# Patient Record
Sex: MALE | Race: WHITE | NOT HISPANIC OR LATINO | Employment: OTHER | URBAN - NONMETROPOLITAN AREA
[De-identification: names, ages, dates, MRNs, and addresses within clinical notes are randomized per-mention and may not be internally consistent; named-entity substitution may affect disease eponyms.]

---

## 2017-05-30 DIAGNOSIS — M25.521 RIGHT ELBOW PAIN: Primary | ICD-10-CM

## 2017-06-05 ENCOUNTER — HOSPITAL ENCOUNTER (OUTPATIENT)
Dept: GENERAL RADIOLOGY | Facility: HOSPITAL | Age: 57
Discharge: HOME | End: 2017-06-05
Attending: ORTHOPAEDIC SURGERY | Admitting: ORTHOPAEDIC SURGERY

## 2017-06-05 ENCOUNTER — OFFICE VISIT (OUTPATIENT)
Dept: ORTHOPEDIC SURGERY | Facility: CLINIC | Age: 57
End: 2017-06-05

## 2017-06-05 VITALS
HEIGHT: 72 IN | HEART RATE: 78 BPM | SYSTOLIC BLOOD PRESSURE: 122 MMHG | DIASTOLIC BLOOD PRESSURE: 84 MMHG | BODY MASS INDEX: 22.89 KG/M2 | WEIGHT: 169 LBS

## 2017-06-05 DIAGNOSIS — M25.521 RIGHT ELBOW PAIN: ICD-10-CM

## 2017-06-05 DIAGNOSIS — M70.22 BILATERAL OLECRANON BURSITIS: Primary | ICD-10-CM

## 2017-06-05 DIAGNOSIS — M70.21 BILATERAL OLECRANON BURSITIS: Primary | ICD-10-CM

## 2017-06-05 PROBLEM — K21.9 GERD (GASTROESOPHAGEAL REFLUX DISEASE): Status: ACTIVE | Noted: 2017-06-05

## 2017-06-05 PROBLEM — J45.909 ASTHMA: Status: ACTIVE | Noted: 2017-06-05

## 2017-06-05 PROBLEM — E11.9 DIABETES (HCC): Status: ACTIVE | Noted: 2017-06-05

## 2017-06-05 PROBLEM — M10.9 GOUT: Status: ACTIVE | Noted: 2017-06-05

## 2017-06-05 PROBLEM — M19.90 OSTEOARTHRITIS: Status: ACTIVE | Noted: 2017-06-05

## 2017-06-05 PROBLEM — G47.30 SLEEP APNEA: Status: ACTIVE | Noted: 2017-06-05

## 2017-06-05 PROBLEM — J44.9 COPD (CHRONIC OBSTRUCTIVE PULMONARY DISEASE) (HCC): Status: ACTIVE | Noted: 2017-06-05

## 2017-06-05 PROBLEM — M81.0 OSTEOPOROSIS: Status: ACTIVE | Noted: 2017-06-05

## 2017-06-05 PROCEDURE — 73080 X-RAY EXAM OF ELBOW: CPT | Performed by: RADIOLOGY

## 2017-06-05 PROCEDURE — 73080 X-RAY EXAM OF ELBOW: CPT

## 2017-06-05 PROCEDURE — 99213 OFFICE O/P EST LOW 20 MIN: CPT | Performed by: ORTHOPAEDIC SURGERY

## 2017-06-05 PROCEDURE — 20605 DRAIN/INJ JOINT/BURSA W/O US: CPT | Performed by: ORTHOPAEDIC SURGERY

## 2017-06-05 RX ORDER — TIZANIDINE 4 MG/1
4 TABLET ORAL 2 TIMES DAILY PRN
COMMUNITY

## 2017-06-05 RX ORDER — METHYLPREDNISOLONE ACETATE 40 MG/ML
40 INJECTION, SUSPENSION INTRA-ARTICULAR; INTRALESIONAL; INTRAMUSCULAR; SOFT TISSUE
Status: COMPLETED | OUTPATIENT
Start: 2017-06-05 | End: 2017-06-05

## 2017-06-05 RX ORDER — NAPROXEN 250 MG/1
250 TABLET ORAL 2 TIMES DAILY PRN
Status: ON HOLD | COMMUNITY
End: 2018-02-01

## 2017-06-05 RX ORDER — GABAPENTIN 300 MG/1
300 CAPSULE ORAL 3 TIMES DAILY
COMMUNITY
End: 2018-01-29

## 2017-06-05 RX ORDER — LIDOCAINE HYDROCHLORIDE 20 MG/ML
2 INJECTION, SOLUTION INFILTRATION; PERINEURAL
Status: COMPLETED | OUTPATIENT
Start: 2017-06-05 | End: 2017-06-05

## 2017-06-05 RX ORDER — LORATADINE 10 MG/1
CAPSULE, LIQUID FILLED ORAL
Status: ON HOLD | COMMUNITY
End: 2018-02-01 | Stop reason: SDUPTHER

## 2017-06-05 RX ADMIN — METHYLPREDNISOLONE ACETATE 40 MG: 40 INJECTION, SUSPENSION INTRA-ARTICULAR; INTRALESIONAL; INTRAMUSCULAR; SOFT TISSUE at 08:30

## 2017-06-05 RX ADMIN — LIDOCAINE HYDROCHLORIDE 2 ML: 20 INJECTION, SOLUTION INFILTRATION; PERINEURAL at 08:30

## 2017-06-05 NOTE — PROGRESS NOTES
New Patient Visit        Patient: Jose Alberto Meng  YOB: 1960  Date of encounter: 6/5/2017      History of Present Illness:   Jose Alberto Meng is a 56 y.o. male referred by PARADISE Rodriguez  for evaluation of right elbow pain of about 3 months duration.  He has had injections in the past, each with some improvement.  He reports his elbow has been much bigger in the past.  He has not experienced significant redness or drainage from his elbow.    PMH:   Patient Active Problem List   Diagnosis   • Right elbow pain   • Diabetes   • Asthma   • COPD (chronic obstructive pulmonary disease)   • Osteoporosis   • Osteoarthritis   • Gout   • Sleep apnea   • GERD (gastroesophageal reflux disease)     Past Medical History:   Diagnosis Date   • Gout        PSH:  Past Surgical History:   Procedure Laterality Date   • CATARACT EXTRACTION         Allergies:     Allergies   Allergen Reactions   • Ibuprofen    • Penicillins        Medications:     Current Outpatient Prescriptions:   •  fluticasone-salmeterol (ADVAIR) 100-50 MCG/DOSE DISKUS, Inhale 2 (Two) Times a Day., Disp: , Rfl:   •  gabapentin (NEURONTIN) 300 MG capsule, Take 300 mg by mouth 3 (Three) Times a Day., Disp: , Rfl:   •  Loratadine 10 MG capsule, Take  by mouth., Disp: , Rfl:   •  metFORMIN (GLUCOPHAGE) 500 MG tablet, Take 500 mg by mouth 2 (Two) Times a Day With Meals., Disp: , Rfl:   •  naproxen (NAPROSYN) 250 MG tablet, Take 250 mg by mouth 2 (Two) Times a Day As Needed for Mild Pain (1-3)., Disp: , Rfl:   •  tiZANidine (ZANAFLEX) 4 MG tablet, Take 4 mg by mouth At Night As Needed for Muscle Spasms., Disp: , Rfl:     Social History:  Social History     Social History   • Marital status: Unknown     Spouse name: N/A   • Number of children: N/A   • Years of education: N/A     Occupational History   • Not on file.     Social History Main Topics   • Smoking status: Current Every Day Smoker   • Smokeless tobacco: Not on file   • Alcohol use No   • Drug use: Not  "on file   • Sexual activity: Not on file     Other Topics Concern   • Not on file     Social History Narrative   • No narrative on file       Family History:     Family History   Problem Relation Age of Onset   • Osteoarthritis Mother    • Osteoporosis Mother    • Rheum arthritis Mother    • Gout Mother    • Heart disease Mother    • Diabetes Mother    • Osteoporosis Father    • Osteoarthritis Father    • Rheum arthritis Father    • Gout Father    • Cancer Father    • Heart disease Father    • Diabetes Father        Review of Systems:   Review of Systems   Constitutional: Positive for fatigue.   HENT: Negative.    Eyes: Negative.    Respiratory: Positive for cough and shortness of breath.    Cardiovascular: Negative.    Gastrointestinal: Negative.    Endocrine: Negative.    Genitourinary: Positive for frequency.   Musculoskeletal:        Pertinent positives listed in HPI.     Skin: Negative.    Allergic/Immunologic: Negative.    Neurological: Negative.    Hematological: Negative.    Psychiatric/Behavioral: Positive for dysphoric mood. The patient is nervous/anxious.        Physical Exam: 56 y.o. male  General Appearance:    Alert and oriented x 3, cooperative, in no acute distress                   Vitals:    06/05/17 0820   BP: 122/84   Pulse: 78   Weight: 169 lb (76.7 kg)   Height: 72\" (182.9 cm)                Musculoskeletal:   Right elbow demonstrates full range of motion.  He has active flexion and extension.  No instability.  He has fluid collection posterior aspect of his elbow with significant tenderness to deep palpation.  Neurovascular grossly intact.     Right Elbow Injection  Date/Time: 6/5/2017 8:30 AM  Consent given by: patient  Site marked: site marked  Supporting Documentation  Indications: joint swelling and pain   Procedure Details  Location: Right olecranon bursa.  Needle size: 18 G  Approach: posterior  Medications administered: 40 mg methylPREDNISolone acetate 40 MG/ML; 2 mL lidocaine " 2%  Aspirate amount: 5 mL  Aspirate: serous  Patient tolerance: patient tolerated the procedure well with no immediate complications        Assessment:   56-year-old male with olecranon bursitis, posterior aspect right elbow, without evidence of infection at this point.  His x-rays show very slight prominence posteriorly.  Today he was aspirated, obtaining 5cc fluid, and injected for Depomedrol 40 mg lidocaine block.  No diagnosis found.    Plan:   He will follow-up on an as-needed basis and he will notify us with any significant redness or drainage if it develops.    Cc: PARADISE Rodriguez

## 2017-06-07 ENCOUNTER — TELEPHONE (OUTPATIENT)
Dept: ORTHOPEDIC SURGERY | Facility: CLINIC | Age: 57
End: 2017-06-07

## 2017-06-07 NOTE — TELEPHONE ENCOUNTER
PATIENT CALLED STATED HE WANTED TO BE SCHEDULED FOR SURGERY ON HIS ELBOW.  STATED HE WAS IN OFFICE ON 6/5/17 LEFT ELBOW.   DR. DOZIER ASPIRATED FLUID AND INJECTED DEPOMEDROL.  HE STATES HIS ELBOW IS WORSE NOW THAN BEFORE IT WAS DRAINED ON Monday. HE C/O REDNESS AND PAIN, HE STATED IT HAS STARTED TO FILL BACK UP.  HE WANTS SURGERY, STATED DR DOZIER ADVISED HIM TO CALL BACK IF GOT WORSE FOR SURGERY. ADVISED PATIENT, I WILL SPEAK WITH DR. DOZIER, AND CALL HIM BACK

## 2017-06-12 ENCOUNTER — TELEPHONE (OUTPATIENT)
Dept: ORTHOPEDIC SURGERY | Facility: CLINIC | Age: 57
End: 2017-06-12

## 2017-07-03 ENCOUNTER — OFFICE VISIT (OUTPATIENT)
Dept: ORTHOPEDIC SURGERY | Facility: CLINIC | Age: 57
End: 2017-07-03

## 2017-07-03 VITALS
WEIGHT: 169 LBS | DIASTOLIC BLOOD PRESSURE: 83 MMHG | OXYGEN SATURATION: 97 % | HEART RATE: 98 BPM | HEIGHT: 72 IN | SYSTOLIC BLOOD PRESSURE: 136 MMHG | BODY MASS INDEX: 22.89 KG/M2

## 2017-07-03 DIAGNOSIS — M70.21 OLECRANON BURSITIS, RIGHT ELBOW: Primary | ICD-10-CM

## 2017-07-03 PROCEDURE — 99213 OFFICE O/P EST LOW 20 MIN: CPT | Performed by: ORTHOPAEDIC SURGERY

## 2017-07-03 PROCEDURE — 20605 DRAIN/INJ JOINT/BURSA W/O US: CPT | Performed by: ORTHOPAEDIC SURGERY

## 2017-07-03 RX ORDER — LORATADINE 10 MG/1
TABLET ORAL
Refills: 6 | COMMUNITY
Start: 2017-06-20

## 2017-07-03 RX ORDER — METHYLPREDNISOLONE ACETATE 40 MG/ML
40 INJECTION, SUSPENSION INTRA-ARTICULAR; INTRALESIONAL; INTRAMUSCULAR; SOFT TISSUE
Status: COMPLETED | OUTPATIENT
Start: 2017-07-03 | End: 2017-07-03

## 2017-07-03 RX ORDER — LIDOCAINE HYDROCHLORIDE 10 MG/ML
1 INJECTION, SOLUTION EPIDURAL; INFILTRATION; INTRACAUDAL; PERINEURAL
Status: COMPLETED | OUTPATIENT
Start: 2017-07-03 | End: 2017-07-03

## 2017-07-03 RX ORDER — TRAZODONE HYDROCHLORIDE 50 MG/1
TABLET ORAL
Refills: 6 | COMMUNITY
Start: 2017-06-20

## 2017-07-03 RX ORDER — FLUTICASONE PROPIONATE 50 MCG
SPRAY, SUSPENSION (ML) NASAL
Refills: 3 | COMMUNITY
Start: 2017-06-20

## 2017-07-03 RX ADMIN — LIDOCAINE HYDROCHLORIDE 1 ML: 10 INJECTION, SOLUTION EPIDURAL; INFILTRATION; INTRACAUDAL; PERINEURAL at 17:00

## 2017-07-03 RX ADMIN — METHYLPREDNISOLONE ACETATE 40 MG: 40 INJECTION, SUSPENSION INTRA-ARTICULAR; INTRALESIONAL; INTRAMUSCULAR; SOFT TISSUE at 17:00

## 2017-07-03 NOTE — PROGRESS NOTES
Patient: Jose Alberto Meng  YOB: 1960  Date of Encounter: 07/03/2017        History of Present Illness:     56-year-old white male seen today for follow-up secondary to ongoing right elbow pain and olecranon bursitis. Patient and aspiration injection at last office visit 6/5/17. He reports that the majority of the swelling has subsided however he was left with hypersensitivity on the posterior aspect of the elbow worse upon any attempt at range of motion. He describes a sharp stabbing sensation upon palpation as well as certain range of motion. He denies any paresthesias. He denies any significant redness or drainage.    Physical Exam: 56 y.o. male .  General Appearance:    Well appearing, cooperative, in no acute distress.       Patient is alert and oriented x 3.          Musculoskeletal: Examination today of the patient's right elbow reveals there is no fluctuant swelling posterior elbow. Patient does have tenderness to palpation along the olecranon and surrounding area. Patient is slightly hypersensitive to palpation. Patient has no significant fluid collection, surrounding erythema or ecchymosis. Neurovascular status grossly intact.    Procedure:  Right elbow injection  Date/Time: 7/3/2017 5:00 PM  Consent given by: patient  Site marked: site marked  Timeout: Immediately prior to procedure a time out was called to verify the correct patient, procedure, equipment, support staff and site/side marked as required   Supporting Documentation  Indications: pain   Procedure Details  Location: elbow - R olecranon bursa  Needle size: 25 G  Approach: posterior  Medications administered: 40 mg methylPREDNISolone acetate 40 MG/ML; 1 mL lidocaine PF 1% 1 %  Patient tolerance: patient tolerated the procedure well with no immediate complications            Assessment:    ICD-10-CM ICD-9-CM   1. Olecranon bursitis, right elbow M70.21 726.33       Plan:    Patient tolerated procedure well. He received relief upon  leaving clinic today. He was instructed to allow 2-3 weeks to fully evaluate the efficacy of the injection. He was informed that there does appear to be some improvement as he did not return with reaccumulation of fluid. Patient return back in 4 weeks if no significant response. We discussed possibility of bursectomy in the future however he would like to exhaust all conservative options before proceeding with this. Patient was agreeable. Follow-up in 4 weeks.        Discussion and Summary:    Written by Nitish Arias PA-C, acting as scribe for Dr. Lamas        This document was signed by Nitish Arias PA-C July 3, 2017

## 2017-07-31 ENCOUNTER — OFFICE VISIT (OUTPATIENT)
Dept: ORTHOPEDIC SURGERY | Facility: CLINIC | Age: 57
End: 2017-07-31

## 2017-07-31 VITALS — WEIGHT: 169 LBS | HEIGHT: 72 IN | BODY MASS INDEX: 22.89 KG/M2

## 2017-07-31 DIAGNOSIS — M70.21 OLECRANON BURSITIS, RIGHT ELBOW: Primary | ICD-10-CM

## 2017-07-31 PROCEDURE — 20605 DRAIN/INJ JOINT/BURSA W/O US: CPT | Performed by: ORTHOPAEDIC SURGERY

## 2017-07-31 RX ORDER — TRIAMCINOLONE ACETONIDE 1 MG/G
CREAM TOPICAL
Refills: 5 | COMMUNITY
Start: 2017-07-06

## 2017-07-31 RX ORDER — DULOXETIN HYDROCHLORIDE 30 MG/1
30 CAPSULE, DELAYED RELEASE ORAL DAILY
COMMUNITY
Start: 2017-07-06

## 2017-07-31 RX ORDER — LIDOCAINE HYDROCHLORIDE 20 MG/ML
5 INJECTION, SOLUTION INFILTRATION; PERINEURAL
Status: COMPLETED | OUTPATIENT
Start: 2017-07-31 | End: 2017-07-31

## 2017-07-31 RX ORDER — METHYLPREDNISOLONE ACETATE 40 MG/ML
40 INJECTION, SUSPENSION INTRA-ARTICULAR; INTRALESIONAL; INTRAMUSCULAR; SOFT TISSUE
Status: COMPLETED | OUTPATIENT
Start: 2017-07-31 | End: 2017-07-31

## 2017-07-31 RX ADMIN — LIDOCAINE HYDROCHLORIDE 5 ML: 20 INJECTION, SOLUTION INFILTRATION; PERINEURAL at 15:31

## 2017-07-31 RX ADMIN — METHYLPREDNISOLONE ACETATE 40 MG: 40 INJECTION, SUSPENSION INTRA-ARTICULAR; INTRALESIONAL; INTRAMUSCULAR; SOFT TISSUE at 15:31

## 2017-07-31 NOTE — PROGRESS NOTES
Jose Alberto Meng   :1960    Date of encounter:2017        HPI:  Jose Alberto Meng is a 57 y.o. male seen here today follow-up of right elbow olecranon bursitis.  His last office visit we proceeded with a cortisone injection.  He states it has improved his pain.  He states it is not as tender.  He still has a small tender spot posteriorly that bothers him when he puts any pressure to the elbow.  He denies paresthesias.      Exam:   Examination of the right elbow reveals no significant effusion to the bursa.  He has no surrounding erythema or warmth.  He still has point tender spot posteriorly along the olecranon.  His neurovascular status is intact.    Medium Joint Arthrocentesis  Date/Time: 2017 3:31 PM  Consent given by: patient  Timeout: Immediately prior to procedure a time out was called to verify the correct patient, procedure, equipment, support staff and site/side marked as required   Supporting Documentation  Indications: pain   Procedure Details  Location: elbow - R olecranon bursa  Needle size: 25 G  Approach: posterolateral  Medications administered: 40 mg methylPREDNISolone acetate 40 MG/ML; 5 mL lidocaine 2%  Patient tolerance: patient tolerated the procedure well with no immediate complications              Assessment    ICD-10-CM ICD-9-CM   1. Olecranon bursitis, right elbow M70.21 726.33       Plan:   A 57-year-old male with chronic olecranon bursitis of the right elbow.  His last cortisone injection is done well and improving his pain.  We will try one further injection in today proceeded with 40 mg of Depo-Medrol with lidocaine block into the olecranon bursa of the right elbow.  We'll monitors response and he'll return with any recurrence of symptoms.    Written by, Brigida OSEI, acting as a scribe for Dr. Lamas

## 2018-01-29 ENCOUNTER — OFFICE VISIT (OUTPATIENT)
Dept: ORTHOPEDIC SURGERY | Facility: CLINIC | Age: 58
End: 2018-01-29

## 2018-01-29 VITALS
DIASTOLIC BLOOD PRESSURE: 78 MMHG | HEART RATE: 101 BPM | BODY MASS INDEX: 25.73 KG/M2 | HEIGHT: 72 IN | WEIGHT: 190 LBS | SYSTOLIC BLOOD PRESSURE: 131 MMHG

## 2018-01-29 DIAGNOSIS — M70.21 OLECRANON BURSITIS OF RIGHT ELBOW: Primary | ICD-10-CM

## 2018-01-29 DIAGNOSIS — M25.521 RIGHT ELBOW PAIN: ICD-10-CM

## 2018-01-29 PROCEDURE — 99214 OFFICE O/P EST MOD 30 MIN: CPT | Performed by: ORTHOPAEDIC SURGERY

## 2018-01-29 RX ORDER — GABAPENTIN 400 MG/1
400 CAPSULE ORAL 3 TIMES DAILY
COMMUNITY
Start: 2018-01-11

## 2018-01-29 RX ORDER — HYDROCODONE BITARTRATE AND ACETAMINOPHEN 5; 325 MG/1; MG/1
TABLET ORAL
COMMUNITY
Start: 2018-01-11

## 2018-01-29 NOTE — PROGRESS NOTES
History and Physical    Patient: Jose Alberto Meng  YOB: 1960  Date of encounter: 01/29/2018      History of Present Illness:   Jose Alberto Meng is a 57 y.o. male who was initially referred to us by Rafia GHOTRA for evaluation of olecranon bursitis of the right elbow.  He's had ongoing pain and swelling for over a year now.  We initially aspirated the bursa and proceeded with a steroid injection.  This helped for short duration and we then proceeded with a second steroid injection.  He states the injections do help but only for a few months at a time.  Continuing to have significant posterior elbow pain is worse if he rests his elbow on any hard surface or bumps it on anything.  He would like to discuss possible surgical intervention.    PMH:   Patient Active Problem List   Diagnosis   • Right elbow pain   • Diabetes   • Asthma   • COPD (chronic obstructive pulmonary disease)   • Osteoporosis   • Osteoarthritis   • Gout   • Sleep apnea   • GERD (gastroesophageal reflux disease)   • Olecranon bursitis of right elbow     Past Medical History:   Diagnosis Date   • Gout          PSH:  Past Surgical History:   Procedure Laterality Date   • CATARACT EXTRACTION         Allergies:     Allergies   Allergen Reactions   • Codeine Itching   • Penicillins Hives and Itching       Medications:     Current Outpatient Prescriptions:   •  DULoxetine (CYMBALTA) 30 MG capsule, , Disp: , Rfl:   •  fluticasone (FLONASE) 50 MCG/ACT nasal spray, INSTILL 1 SPRAY IN THE NOSTRILS TWO TIMES A DAY, Disp: , Rfl: 3  •  fluticasone-salmeterol (ADVAIR) 100-50 MCG/DOSE DISKUS, Inhale 2 (Two) Times a Day., Disp: , Rfl:   •  gabapentin (NEURONTIN) 400 MG capsule, , Disp: , Rfl:   •  HYDROcodone-acetaminophen (NORCO) 5-325 MG per tablet, , Disp: , Rfl:   •  loratadine (CLARITIN) 10 MG tablet, TAKE ONE TABLET BY MOUTH ONCE DAILY, Disp: , Rfl: 6  •  Loratadine 10 MG capsule, Take  by mouth., Disp: , Rfl:   •  metFORMIN (GLUCOPHAGE) 500 MG  tablet, Take 500 mg by mouth 2 (Two) Times a Day With Meals., Disp: , Rfl:   •  tiZANidine (ZANAFLEX) 4 MG tablet, Take 4 mg by mouth At Night As Needed for Muscle Spasms., Disp: , Rfl:   •  traZODone (DESYREL) 50 MG tablet, TAKE ONE TABLET BY MOUTH EVERY NIGHT AT BEDTIME, Disp: , Rfl: 6  •  triamcinolone (KENALOG) 0.1 % cream, APPLY A THIN LAYER (1GRAM=QUARTER SIZE) AMOUNT TO AFFECTED AREA TWO TIMES A DAY, Disp: , Rfl: 5  •  naproxen (NAPROSYN) 250 MG tablet, Take 250 mg by mouth 2 (Two) Times a Day As Needed for Mild Pain (1-3)., Disp: , Rfl:     Social History:     Social History     Occupational History   • Not on file.     Social History Main Topics   • Smoking status: Current Every Day Smoker     Packs/day: 1.00     Years: 42.00     Types: Cigarettes   • Smokeless tobacco: Never Used      Comment: patient willing to try to quit    • Alcohol use No   • Drug use: No   • Sexual activity: Not on file      Social History     Social History Narrative       Family History:     Family History   Problem Relation Age of Onset   • Osteoarthritis Mother    • Osteoporosis Mother    • Rheum arthritis Mother    • Gout Mother    • Heart disease Mother    • Diabetes Mother    • Osteoporosis Father    • Osteoarthritis Father    • Rheum arthritis Father    • Gout Father    • Cancer Father    • Heart disease Father    • Diabetes Father        Review of Systems:   Review of Systems   Constitutional: Negative.    HENT: Negative.    Eyes: Positive for redness.   Respiratory: Positive for cough, chest tightness, shortness of breath and wheezing.    Cardiovascular: Negative.    Gastrointestinal: Negative.    Endocrine: Positive for polydipsia, polyphagia and polyuria.   Genitourinary: Positive for frequency.   Musculoskeletal:        Pertinent positives mentioned in HPI   Skin: Negative.    Neurological: Positive for syncope.   Hematological: Negative.    Psychiatric/Behavioral: Positive for confusion, dysphoric mood and sleep  "disturbance. The patient is nervous/anxious.        Physical Exam:   General Appearance:    57 y.o. male  cooperative, in no acute distress.  Alert and oriented x 3,                   Vitals:    01/29/18 0808   BP: 131/78   BP Location: Right arm   Patient Position: Sitting   Cuff Size: Adult   Pulse: 101   Weight: 86.2 kg (190 lb)   Height: 182.9 cm (72\")          HEENT: Unremarkable      Neck: Supple without lymphadenopathy.      Chest: Clear to ascultation bilaterally. Normal respiratory effort.      Heart: Regular rate and rhythm. No murmurs noted.      Skin:  Warm and dry without any rash.      Musculoskeletal: Examination of the right elbow reveals no fluid within the olecranon bursa but he has significant tenderness along the bursa .  He has full range of motion motion of the elbow with no gross instability.  His neurovascular status is intact.      Radiology:     Previous x-rays of the elbow from June 2017 were reviewed and at that time revealed soft tissue swelling within the olecranon bursa.  No bony pathology seen.    Assessment    ICD-10-CM ICD-9-CM   1. Olecranon bursitis of right elbow M70.21 726.33   2. Right elbow pain M25.521 719.42   3. Olecranon bursitis of left elbow M70.22 726.33       Plan:  A 57-year-old male with chronic olecranon bursitis of the right elbow.  He's had several injections with recurrence of pain.  Given his continued difficulty with pain and discomfort we have discussed proceeding with surgical intervention including olecranon bursectomy of the right elbow.  We have discussed the risks, benefits, and future outcomes of surgery.  He accepts these risks and is agreeable to surgery We'll place him on the OR schedule at [facility] for February 1, 2018.    Written by, Brigida OSEI, acting as a scribe for Dr. Lamas    I advised the patient of the risks in continuing to use tobacco, and I provided this patient with smoking cessation educational materials.  I also " discussed how to quit smokingt.  During this visit, I spent less than 3 minutes counseling the patient regarding smoking cessation.     Patient's BMI is above normal parameters. Follow-up plan includes:  educational material.

## 2018-01-30 ENCOUNTER — TELEPHONE (OUTPATIENT)
Dept: ORTHOPEDIC SURGERY | Facility: CLINIC | Age: 58
End: 2018-01-30

## 2018-01-30 ENCOUNTER — PREP FOR SURGERY (OUTPATIENT)
Dept: OTHER | Facility: HOSPITAL | Age: 58
End: 2018-01-30

## 2018-01-30 NOTE — H&P
History and Physical    Patient: Jose Alberto Meng  YOB: 1960  Date of encounter: 01/29/2018      History of Present Illness:   Jose Alberto Meng is a 57 y.o. male who was initially referred to us by Rafia GHOTRA for evaluation of olecranon bursitis of the right elbow.  He's had ongoing pain and swelling for over a year now.  We initially aspirated the bursa and proceeded with a steroid injection.  This helped for short duration and we then proceeded with a second steroid injection.  He states the injections do help but only for a few months at a time.  Continuing to have significant posterior elbow pain is worse if he rests his elbow on any hard surface or bumps it on anything.  He would like to discuss possible surgical intervention.    PMH:   Patient Active Problem List   Diagnosis   • Right elbow pain   • Diabetes   • Asthma   • COPD (chronic obstructive pulmonary disease)   • Osteoporosis   • Osteoarthritis   • Gout   • Sleep apnea   • GERD (gastroesophageal reflux disease)   • Olecranon bursitis of right elbow     Past Medical History:   Diagnosis Date   • Gout          PSH:  Past Surgical History:   Procedure Laterality Date   • CATARACT EXTRACTION         Allergies:     Allergies   Allergen Reactions   • Codeine Itching   • Penicillins Hives and Itching       Medications:     Current Outpatient Prescriptions:   •  DULoxetine (CYMBALTA) 30 MG capsule, , Disp: , Rfl:   •  fluticasone (FLONASE) 50 MCG/ACT nasal spray, INSTILL 1 SPRAY IN THE NOSTRILS TWO TIMES A DAY, Disp: , Rfl: 3  •  fluticasone-salmeterol (ADVAIR) 100-50 MCG/DOSE DISKUS, Inhale 2 (Two) Times a Day., Disp: , Rfl:   •  gabapentin (NEURONTIN) 400 MG capsule, , Disp: , Rfl:   •  HYDROcodone-acetaminophen (NORCO) 5-325 MG per tablet, , Disp: , Rfl:   •  loratadine (CLARITIN) 10 MG tablet, TAKE ONE TABLET BY MOUTH ONCE DAILY, Disp: , Rfl: 6  •  Loratadine 10 MG capsule, Take  by mouth., Disp: , Rfl:   •  metFORMIN (GLUCOPHAGE) 500 MG  tablet, Take 500 mg by mouth 2 (Two) Times a Day With Meals., Disp: , Rfl:   •  tiZANidine (ZANAFLEX) 4 MG tablet, Take 4 mg by mouth At Night As Needed for Muscle Spasms., Disp: , Rfl:   •  traZODone (DESYREL) 50 MG tablet, TAKE ONE TABLET BY MOUTH EVERY NIGHT AT BEDTIME, Disp: , Rfl: 6  •  triamcinolone (KENALOG) 0.1 % cream, APPLY A THIN LAYER (1GRAM=QUARTER SIZE) AMOUNT TO AFFECTED AREA TWO TIMES A DAY, Disp: , Rfl: 5  •  naproxen (NAPROSYN) 250 MG tablet, Take 250 mg by mouth 2 (Two) Times a Day As Needed for Mild Pain (1-3)., Disp: , Rfl:     Social History:     Social History     Occupational History   • Not on file.     Social History Main Topics   • Smoking status: Current Every Day Smoker     Packs/day: 1.00     Years: 42.00     Types: Cigarettes   • Smokeless tobacco: Never Used      Comment: patient willing to try to quit    • Alcohol use No   • Drug use: No   • Sexual activity: Not on file      Social History     Social History Narrative       Family History:     Family History   Problem Relation Age of Onset   • Osteoarthritis Mother    • Osteoporosis Mother    • Rheum arthritis Mother    • Gout Mother    • Heart disease Mother    • Diabetes Mother    • Osteoporosis Father    • Osteoarthritis Father    • Rheum arthritis Father    • Gout Father    • Cancer Father    • Heart disease Father    • Diabetes Father        Review of Systems:   Review of Systems   Constitutional: Negative.    HENT: Negative.    Eyes: Positive for redness.   Respiratory: Positive for cough, chest tightness, shortness of breath and wheezing.    Cardiovascular: Negative.    Gastrointestinal: Negative.    Endocrine: Positive for polydipsia, polyphagia and polyuria.   Genitourinary: Positive for frequency.   Musculoskeletal:        Pertinent positives mentioned in HPI   Skin: Negative.    Neurological: Positive for syncope.   Hematological: Negative.    Psychiatric/Behavioral: Positive for confusion, dysphoric mood and sleep  "disturbance. The patient is nervous/anxious.        Physical Exam:   General Appearance:    57 y.o. male  cooperative, in no acute distress.  Alert and oriented x 3,                   Vitals:    01/29/18 0808   BP: 131/78   BP Location: Right arm   Patient Position: Sitting   Cuff Size: Adult   Pulse: 101   Weight: 86.2 kg (190 lb)   Height: 182.9 cm (72\")          HEENT: Unremarkable      Neck: Supple without lymphadenopathy.      Chest: Clear to ascultation bilaterally. Normal respiratory effort.      Heart: Regular rate and rhythm. No murmurs noted.      Skin:  Warm and dry without any rash.      Musculoskeletal: Examination of the right elbow reveals no fluid within the olecranon bursa but he has significant tenderness along the bursa .  He has full range of motion motion of the elbow with no gross instability.  His neurovascular status is intact.      Radiology:     Previous x-rays of the elbow from June 2017 were reviewed and at that time revealed soft tissue swelling within the olecranon bursa.  No bony pathology seen.    Assessment    ICD-10-CM ICD-9-CM   1. Olecranon bursitis of right elbow M70.21 726.33   2. Right elbow pain M25.521 719.42   3. Olecranon bursitis of left elbow M70.22 726.33       Plan:  A 57-year-old male with chronic olecranon bursitis of the right elbow.  He's had several injections with recurrence of pain.  Given his continued difficulty with pain and discomfort we have discussed proceeding with surgical intervention including olecranon bursectomy of the right elbow.  We have discussed the risks, benefits, and future outcomes of surgery.  He accepts these risks and is agreeable to surgery We'll place him on the OR schedule at [facility] for February 1, 2018.    Written by, Brigida OSEI, acting as a scribe for Dr. Lamas    I advised the patient of the risks in continuing to use tobacco, and I provided this patient with smoking cessation educational materials.  I also " discussed how to quit smokingt.  During this visit, I spent less than 3 minutes counseling the patient regarding smoking cessation.     Patient's BMI is above normal parameters. Follow-up plan includes:  educational material.

## 2018-01-30 NOTE — TELEPHONE ENCOUNTER
I have left patient 5 messages concerning his PAT date/time 1-31-18 @ 9:15. There has been no response from the patient. yang [place] , was notified 01/30/18.

## 2018-01-31 ENCOUNTER — TELEPHONE (OUTPATIENT)
Dept: ORTHOPEDIC SURGERY | Facility: CLINIC | Age: 58
End: 2018-01-31

## 2018-01-31 NOTE — TELEPHONE ENCOUNTER
Called patient on [phone] spoke directly with patient informed that he had to do pre op first before surgery on 1-31-18 so he needed to be at the [facility], [place], operated by [facility] at 8 am. I also reminded patient that he needed to remain NPO after midnight and notify office if he wasn't able to make appointment. Patient verbalized understanding and confirmed appointment.

## 2018-01-31 NOTE — H&P
History and Physical    Patient: Jose Alberto Meng  YOB: 1960  Date of encounter: 01/29/2018      History of Present Illness:   Jose Alberto Meng is a 57 y.o. male who was initially referred to us by Rafia GHOTRA for evaluation of olecranon bursitis of the right elbow.  He's had ongoing pain and swelling for over a year now.  We initially aspirated the bursa and proceeded with a steroid injection.  This helped for short duration and we then proceeded with a second steroid injection.  He states the injections do help but only for a few months at a time.  Continuing to have significant posterior elbow pain is worse if he rests his elbow on any hard surface or bumps it on anything.  He would like to discuss possible surgical intervention.    PMH:   Patient Active Problem List   Diagnosis   • Right elbow pain   • Diabetes   • Asthma   • COPD (chronic obstructive pulmonary disease)   • Osteoporosis   • Osteoarthritis   • Gout   • Sleep apnea   • GERD (gastroesophageal reflux disease)   • Olecranon bursitis of right elbow     Past Medical History:   Diagnosis Date   • Gout          PSH:  Past Surgical History:   Procedure Laterality Date   • CATARACT EXTRACTION         Allergies:     Allergies   Allergen Reactions   • Codeine Itching   • Penicillins Hives and Itching       Medications:     Current Outpatient Prescriptions:   •  DULoxetine (CYMBALTA) 30 MG capsule, , Disp: , Rfl:   •  fluticasone (FLONASE) 50 MCG/ACT nasal spray, INSTILL 1 SPRAY IN THE NOSTRILS TWO TIMES A DAY, Disp: , Rfl: 3  •  fluticasone-salmeterol (ADVAIR) 100-50 MCG/DOSE DISKUS, Inhale 2 (Two) Times a Day., Disp: , Rfl:   •  gabapentin (NEURONTIN) 400 MG capsule, , Disp: , Rfl:   •  HYDROcodone-acetaminophen (NORCO) 5-325 MG per tablet, , Disp: , Rfl:   •  loratadine (CLARITIN) 10 MG tablet, TAKE ONE TABLET BY MOUTH ONCE DAILY, Disp: , Rfl: 6  •  Loratadine 10 MG capsule, Take  by mouth., Disp: , Rfl:   •  metFORMIN (GLUCOPHAGE) 500 MG  tablet, Take 500 mg by mouth 2 (Two) Times a Day With Meals., Disp: , Rfl:   •  tiZANidine (ZANAFLEX) 4 MG tablet, Take 4 mg by mouth At Night As Needed for Muscle Spasms., Disp: , Rfl:   •  traZODone (DESYREL) 50 MG tablet, TAKE ONE TABLET BY MOUTH EVERY NIGHT AT BEDTIME, Disp: , Rfl: 6  •  triamcinolone (KENALOG) 0.1 % cream, APPLY A THIN LAYER (1GRAM=QUARTER SIZE) AMOUNT TO AFFECTED AREA TWO TIMES A DAY, Disp: , Rfl: 5  •  naproxen (NAPROSYN) 250 MG tablet, Take 250 mg by mouth 2 (Two) Times a Day As Needed for Mild Pain (1-3)., Disp: , Rfl:     Social History:     Social History     Occupational History   • Not on file.     Social History Main Topics   • Smoking status: Current Every Day Smoker     Packs/day: 1.00     Years: 42.00     Types: Cigarettes   • Smokeless tobacco: Never Used      Comment: patient willing to try to quit    • Alcohol use No   • Drug use: No   • Sexual activity: Not on file      Social History     Social History Narrative       Family History:     Family History   Problem Relation Age of Onset   • Osteoarthritis Mother    • Osteoporosis Mother    • Rheum arthritis Mother    • Gout Mother    • Heart disease Mother    • Diabetes Mother    • Osteoporosis Father    • Osteoarthritis Father    • Rheum arthritis Father    • Gout Father    • Cancer Father    • Heart disease Father    • Diabetes Father        Review of Systems:   Review of Systems   Constitutional: Negative.    HENT: Negative.    Eyes: Positive for redness.   Respiratory: Positive for cough, chest tightness, shortness of breath and wheezing.    Cardiovascular: Negative.    Gastrointestinal: Negative.    Endocrine: Positive for polydipsia, polyphagia and polyuria.   Genitourinary: Positive for frequency.   Musculoskeletal:        Pertinent positives mentioned in HPI   Skin: Negative.    Neurological: Positive for syncope.   Hematological: Negative.    Psychiatric/Behavioral: Positive for confusion, dysphoric mood and sleep  "disturbance. The patient is nervous/anxious.        Physical Exam:   General Appearance:    57 y.o. male  cooperative, in no acute distress.  Alert and oriented x 3,                   Vitals:    01/29/18 0808   BP: 131/78   BP Location: Right arm   Patient Position: Sitting   Cuff Size: Adult   Pulse: 101   Weight: 86.2 kg (190 lb)   Height: 182.9 cm (72\")          HEENT: Unremarkable      Neck: Supple without lymphadenopathy.      Chest: Clear to ascultation bilaterally. Normal respiratory effort.      Heart: Regular rate and rhythm. No murmurs noted.      Skin:  Warm and dry without any rash.      Musculoskeletal: Examination of the right elbow reveals no fluid within the olecranon bursa but he has significant tenderness along the bursa .  He has full range of motion motion of the elbow with no gross instability.  His neurovascular status is intact.      Radiology:     Previous x-rays of the elbow from June 2017 were reviewed and at that time revealed soft tissue swelling within the olecranon bursa.  No bony pathology seen.    Assessment    ICD-10-CM ICD-9-CM   1. Olecranon bursitis of right elbow M70.21 726.33   2. Right elbow pain M25.521 719.42   3. Olecranon bursitis of left elbow M70.22 726.33       Plan:  A 57-year-old male with chronic olecranon bursitis of the right elbow.  He's had several injections with recurrence of pain.  Given his continued difficulty with pain and discomfort we have discussed proceeding with surgical intervention including olecranon bursectomy of the right elbow.  We have discussed the risks, benefits, and future outcomes of surgery.  He accepts these risks and is agreeable to surgery We'll place him on the OR schedule at [facility] for February 1, 2018.    Written by, Brigida OSEI, acting as a scribe for Dr. Lamas    I advised the patient of the risks in continuing to use tobacco, and I provided this patient with smoking cessation educational materials.  I also " discussed how to quit smokingt.  During this visit, I spent less than 3 minutes counseling the patient regarding smoking cessation.     Patient's BMI is above normal parameters. Follow-up plan includes:  educational material.

## 2018-02-01 ENCOUNTER — HOSPITAL ENCOUNTER (OUTPATIENT)
Facility: HOSPITAL | Age: 58
Setting detail: HOSPITAL OUTPATIENT SURGERY
Discharge: HOME | End: 2018-02-01
Attending: ORTHOPAEDIC SURGERY | Admitting: ORTHOPAEDIC SURGERY

## 2018-02-01 ENCOUNTER — ANESTHESIA (OUTPATIENT)
Dept: PERIOP | Facility: HOSPITAL | Age: 58
End: 2018-02-01

## 2018-02-01 ENCOUNTER — ANESTHESIA EVENT (OUTPATIENT)
Dept: PERIOP | Facility: HOSPITAL | Age: 58
End: 2018-02-01

## 2018-02-01 ENCOUNTER — APPOINTMENT (OUTPATIENT)
Dept: PREADMISSION TESTING | Facility: HOSPITAL | Age: 58
End: 2018-02-01

## 2018-02-01 VITALS
TEMPERATURE: 98.2 F | RESPIRATION RATE: 16 BRPM | OXYGEN SATURATION: 98 % | WEIGHT: 190 LBS | HEART RATE: 87 BPM | SYSTOLIC BLOOD PRESSURE: 117 MMHG | DIASTOLIC BLOOD PRESSURE: 74 MMHG | HEIGHT: 72 IN | BODY MASS INDEX: 25.73 KG/M2

## 2018-02-01 DIAGNOSIS — M70.21 OLECRANON BURSITIS OF RIGHT ELBOW: ICD-10-CM

## 2018-02-01 DIAGNOSIS — M25.521 RIGHT ELBOW PAIN: ICD-10-CM

## 2018-02-01 LAB
ANION GAP SERPL CALCULATED.3IONS-SCNC: 2.1 MMOL/L (ref 3.6–11.2)
BUN BLD-MCNC: 9 MG/DL (ref 7–21)
BUN/CREAT SERPL: 8.7 (ref 7–25)
CALCIUM SPEC-SCNC: 9.6 MG/DL (ref 7.7–10)
CHLORIDE SERPL-SCNC: 107 MMOL/L (ref 99–112)
CO2 SERPL-SCNC: 28.9 MMOL/L (ref 24.3–31.9)
CREAT BLD-MCNC: 1.03 MG/DL (ref 0.43–1.29)
DEPRECATED RDW RBC AUTO: 44.8 FL (ref 37–54)
ERYTHROCYTE [DISTWIDTH] IN BLOOD BY AUTOMATED COUNT: 13.5 % (ref 11.5–14.5)
GFR SERPL CREATININE-BSD FRML MDRD: 74 ML/MIN/1.73
GLUCOSE BLD-MCNC: 137 MG/DL (ref 70–110)
GLUCOSE BLDC GLUCOMTR-MCNC: 122 MG/DL (ref 70–130)
HCT VFR BLD AUTO: 47.7 % (ref 42–52)
HGB BLD-MCNC: 16.1 G/DL (ref 14–18)
MCH RBC QN AUTO: 30.6 PG (ref 27–33)
MCHC RBC AUTO-ENTMCNC: 33.8 G/DL (ref 33–37)
MCV RBC AUTO: 90.7 FL (ref 80–94)
OSMOLALITY SERPL CALC.SUM OF ELEC: 276.5 MOSM/KG (ref 273–305)
PLATELET # BLD AUTO: 219 10*3/MM3 (ref 130–400)
PMV BLD AUTO: 10.4 FL (ref 6–10)
POTASSIUM BLD-SCNC: 4.5 MMOL/L (ref 3.5–5.3)
RBC # BLD AUTO: 5.26 10*6/MM3 (ref 4.7–6.1)
SODIUM BLD-SCNC: 138 MMOL/L (ref 135–153)
WBC NRBC COR # BLD: 10.39 10*3/MM3 (ref 4.5–12.5)

## 2018-02-01 PROCEDURE — 24105 EXCISION OLECRANON BURSA: CPT | Performed by: ORTHOPAEDIC SURGERY

## 2018-02-01 PROCEDURE — 94799 UNLISTED PULMONARY SVC/PX: CPT

## 2018-02-01 PROCEDURE — 88304 TISSUE EXAM BY PATHOLOGIST: CPT | Performed by: ORTHOPAEDIC SURGERY

## 2018-02-01 PROCEDURE — 25010000002 FENTANYL CITRATE (PF) 100 MCG/2ML SOLUTION: Performed by: NURSE ANESTHETIST, CERTIFIED REGISTERED

## 2018-02-01 PROCEDURE — 82962 GLUCOSE BLOOD TEST: CPT

## 2018-02-01 PROCEDURE — 85027 COMPLETE CBC AUTOMATED: CPT | Performed by: ANESTHESIOLOGY

## 2018-02-01 PROCEDURE — 25010000002 PROPOFOL 10 MG/ML EMULSION: Performed by: NURSE ANESTHETIST, CERTIFIED REGISTERED

## 2018-02-01 PROCEDURE — 80048 BASIC METABOLIC PNL TOTAL CA: CPT | Performed by: ANESTHESIOLOGY

## 2018-02-01 PROCEDURE — 25010000002 PROPOFOL 1000 MG/ML EMULSION: Performed by: NURSE ANESTHETIST, CERTIFIED REGISTERED

## 2018-02-01 PROCEDURE — 36415 COLL VENOUS BLD VENIPUNCTURE: CPT

## 2018-02-01 RX ORDER — ONDANSETRON 2 MG/ML
4 INJECTION INTRAMUSCULAR; INTRAVENOUS ONCE AS NEEDED
Status: DISCONTINUED | OUTPATIENT
Start: 2018-02-01 | End: 2018-02-01 | Stop reason: HOSPADM

## 2018-02-01 RX ORDER — MIDAZOLAM HYDROCHLORIDE 1 MG/ML
INJECTION INTRAMUSCULAR; INTRAVENOUS AS NEEDED
Status: DISCONTINUED | OUTPATIENT
Start: 2018-02-01 | End: 2018-02-01

## 2018-02-01 RX ORDER — SODIUM CHLORIDE 0.9 % (FLUSH) 0.9 %
1-10 SYRINGE (ML) INJECTION AS NEEDED
Status: DISCONTINUED | OUTPATIENT
Start: 2018-02-01 | End: 2018-02-01 | Stop reason: HOSPADM

## 2018-02-01 RX ORDER — BUPIVACAINE HYDROCHLORIDE 5 MG/ML
INJECTION, SOLUTION PERINEURAL AS NEEDED
Status: DISCONTINUED | OUTPATIENT
Start: 2018-02-01 | End: 2018-02-01 | Stop reason: HOSPADM

## 2018-02-01 RX ORDER — FENTANYL CITRATE 50 UG/ML
50 INJECTION, SOLUTION INTRAMUSCULAR; INTRAVENOUS
Status: DISCONTINUED | OUTPATIENT
Start: 2018-02-01 | End: 2018-02-01 | Stop reason: HOSPADM

## 2018-02-01 RX ORDER — OMEPRAZOLE 20 MG/1
20 CAPSULE, DELAYED RELEASE ORAL DAILY PRN
COMMUNITY

## 2018-02-01 RX ORDER — MEPERIDINE HYDROCHLORIDE 25 MG/ML
12.5 INJECTION INTRAMUSCULAR; INTRAVENOUS; SUBCUTANEOUS
Status: DISCONTINUED | OUTPATIENT
Start: 2018-02-01 | End: 2018-02-01 | Stop reason: HOSPADM

## 2018-02-01 RX ORDER — LIDOCAINE HYDROCHLORIDE 20 MG/ML
INJECTION, SOLUTION INFILTRATION; PERINEURAL AS NEEDED
Status: DISCONTINUED | OUTPATIENT
Start: 2018-02-01 | End: 2018-02-01 | Stop reason: SURG

## 2018-02-01 RX ORDER — PROPOFOL 10 MG/ML
VIAL (ML) INTRAVENOUS AS NEEDED
Status: DISCONTINUED | OUTPATIENT
Start: 2018-02-01 | End: 2018-02-01 | Stop reason: SURG

## 2018-02-01 RX ORDER — OXYCODONE HYDROCHLORIDE AND ACETAMINOPHEN 5; 325 MG/1; MG/1
1-2 TABLET ORAL EVERY 4 HOURS PRN
Qty: 20 TABLET | Refills: 0 | Status: SHIPPED | OUTPATIENT
Start: 2018-02-01 | End: 2018-02-07

## 2018-02-01 RX ORDER — FENTANYL CITRATE 50 UG/ML
INJECTION, SOLUTION INTRAMUSCULAR; INTRAVENOUS AS NEEDED
Status: DISCONTINUED | OUTPATIENT
Start: 2018-02-01 | End: 2018-02-01 | Stop reason: SURG

## 2018-02-01 RX ORDER — MAGNESIUM HYDROXIDE 1200 MG/15ML
LIQUID ORAL AS NEEDED
Status: DISCONTINUED | OUTPATIENT
Start: 2018-02-01 | End: 2018-02-01 | Stop reason: HOSPADM

## 2018-02-01 RX ORDER — IPRATROPIUM BROMIDE AND ALBUTEROL SULFATE 2.5; .5 MG/3ML; MG/3ML
3 SOLUTION RESPIRATORY (INHALATION) ONCE AS NEEDED
Status: DISCONTINUED | OUTPATIENT
Start: 2018-02-01 | End: 2018-02-01 | Stop reason: HOSPADM

## 2018-02-01 RX ORDER — SODIUM CHLORIDE, SODIUM LACTATE, POTASSIUM CHLORIDE, CALCIUM CHLORIDE 600; 310; 30; 20 MG/100ML; MG/100ML; MG/100ML; MG/100ML
125 INJECTION, SOLUTION INTRAVENOUS CONTINUOUS
Status: DISCONTINUED | OUTPATIENT
Start: 2018-02-01 | End: 2018-02-01 | Stop reason: HOSPADM

## 2018-02-01 RX ADMIN — PROPOFOL 140 MCG/KG/MIN: 10 INJECTION, EMULSION INTRAVENOUS at 10:40

## 2018-02-01 RX ADMIN — PROPOFOL 20 MG: 10 INJECTION, EMULSION INTRAVENOUS at 10:40

## 2018-02-01 RX ADMIN — SODIUM CHLORIDE, POTASSIUM CHLORIDE, SODIUM LACTATE AND CALCIUM CHLORIDE 125 ML/HR: 600; 310; 30; 20 INJECTION, SOLUTION INTRAVENOUS at 10:07

## 2018-02-01 RX ADMIN — FENTANYL CITRATE 50 MCG: 50 INJECTION INTRAMUSCULAR; INTRAVENOUS at 11:24

## 2018-02-01 RX ADMIN — FENTANYL CITRATE 100 MCG: 50 INJECTION INTRAMUSCULAR; INTRAVENOUS at 10:35

## 2018-02-01 RX ADMIN — LIDOCAINE HYDROCHLORIDE 60 MG: 20 INJECTION, SOLUTION INFILTRATION; PERINEURAL at 10:40

## 2018-02-01 NOTE — PLAN OF CARE
Problem: Perioperative Period (Adult)  Goal: Signs and Symptoms of Listed Potential Problems Will be Absent or Manageable (Perioperative Period)  Outcome: Ongoing (interventions implemented as appropriate)   02/01/18 1023   Perioperative Period   Problems Assessed (Perioperative Period) pain   Problems Present (Perioperative Period) pain

## 2018-02-01 NOTE — BRIEF OP NOTE
BURSECTOMY  Progress Note    Jose Alberto Meng  2/1/2018    Pre-op Diagnosis:   Right elbow pain [M25.521]  Olecranon bursitis of right elbow [M70.21]       Post-Op Diagnosis Codes:     * Right elbow pain [M25.521]     * Olecranon bursitis of right elbow [M70.21]    Procedure/CPT® Codes:      Procedure(s):  OLECRANON BURSECTOMY RIGHT ELBOW    Surgeon(s):  Sam Lamas MD    Anesthesia: General/local  Staff:   Circulator: Nino Velasquez RN  Scrub Person: Shannan Mann  Assistant: Lora Abarca    Estimated Blood Loss:     Urine Voided: * No values recorded between 2/1/2018 10:33 AM and 2/1/2018 11:06 AM *    Specimens:                  ID Type Source Tests Collected by Time Destination   A : RIGHT ELBOW BURSA Tissue Elbow, Right TISSUE EXAM Sam Lamas MD 2/1/2018 1052          Drains:           Findings:     Complications:       Sam Lamas MD     Date: 2/1/2018  Time: 11:13 AM

## 2018-02-01 NOTE — PLAN OF CARE
Problem: Patient Care Overview (Adult)  Goal: Adult Individualization and Mutuality  Outcome: Ongoing (interventions implemented as appropriate)   02/01/18 1024   Individualization   Patient Specific Preferences Answers to Jonah

## 2018-02-01 NOTE — PLAN OF CARE
Problem: Patient Care Overview (Adult)  Goal: Discharge Needs Assessment  Outcome: Ongoing (interventions implemented as appropriate)   02/01/18 1024   Discharge Needs Assessment   Concerns To Be Addressed no discharge needs identified   Readmission Within The Last 30 Days no previous admission in last 30 days   Equipment Needed After Discharge respiratory supplies   Discharge Disposition home or self-care   Current Health   Anticipated Changes Related to Illness none   Self-Care   Equipment Currently Used at Home respiratory supplies   Living Environment   Transportation Available car

## 2018-02-01 NOTE — OP NOTE
DATE OF SURGERY: 02/01/18    PREOPERATIVE DIAGNOSIS: Olecranon bursitis right elbow     POSTOPERATIVE DIAGNOSIS: Same     OPERATIONS PERFORMED: Excision olecranon bursa right elbow     SURGEON: Sam Lamas MD      ASSISTANT: Lora Abarca     ANESTHESIA: Gen./local     ESTIMATED BLOOD LOSS: None     ANTIBIOTICS: None     DESCRIPTION OF PROCEDURE: With patient in the operating theater general anesthetic administered right hand and arm sterilely prepped and draped in usual manner.  The extremity was exsanguinated tourniquet inflated to 250 mmHg.  Aspect right olecranon was infiltrated with 3 cc 0.5 Marcaine.  Longitudinal incision then made 5 cm in length skin divided sharply.  Olecranon bursa was then exposed and circumferentially released from medial and lateral skin flaps.  The olecranon bursa was then excised from the posterior aspect of the olecranon.  There was significant area of the hyperemic tissue attached to the olecranon.  This was completely removed.  The tourniquet was inflated hemostasis obtained the wound closed in layers with staples for final closure sterile dressing was applied he was taken to recovery room in stable condition.       Dictator Signature:___________________________  Sam Lamas M.D.         Niki GHOTRA

## 2018-02-01 NOTE — H&P (VIEW-ONLY)
History and Physical    Patient: Jose Alberto Meng  YOB: 1960  Date of encounter: 01/29/2018      History of Present Illness:   Jose Alberto Meng is a 57 y.o. male who was initially referred to us by Rafia GHOTRA for evaluation of olecranon bursitis of the right elbow.  He's had ongoing pain and swelling for over a year now.  We initially aspirated the bursa and proceeded with a steroid injection.  This helped for short duration and we then proceeded with a second steroid injection.  He states the injections do help but only for a few months at a time.  Continuing to have significant posterior elbow pain is worse if he rests his elbow on any hard surface or bumps it on anything.  He would like to discuss possible surgical intervention.    PMH:   Patient Active Problem List   Diagnosis   • Right elbow pain   • Diabetes   • Asthma   • COPD (chronic obstructive pulmonary disease)   • Osteoporosis   • Osteoarthritis   • Gout   • Sleep apnea   • GERD (gastroesophageal reflux disease)   • Olecranon bursitis of right elbow     Past Medical History:   Diagnosis Date   • Gout          PSH:  Past Surgical History:   Procedure Laterality Date   • CATARACT EXTRACTION         Allergies:     Allergies   Allergen Reactions   • Codeine Itching   • Penicillins Hives and Itching       Medications:     Current Outpatient Prescriptions:   •  DULoxetine (CYMBALTA) 30 MG capsule, , Disp: , Rfl:   •  fluticasone (FLONASE) 50 MCG/ACT nasal spray, INSTILL 1 SPRAY IN THE NOSTRILS TWO TIMES A DAY, Disp: , Rfl: 3  •  fluticasone-salmeterol (ADVAIR) 100-50 MCG/DOSE DISKUS, Inhale 2 (Two) Times a Day., Disp: , Rfl:   •  gabapentin (NEURONTIN) 400 MG capsule, , Disp: , Rfl:   •  HYDROcodone-acetaminophen (NORCO) 5-325 MG per tablet, , Disp: , Rfl:   •  loratadine (CLARITIN) 10 MG tablet, TAKE ONE TABLET BY MOUTH ONCE DAILY, Disp: , Rfl: 6  •  Loratadine 10 MG capsule, Take  by mouth., Disp: , Rfl:   •  metFORMIN (GLUCOPHAGE) 500 MG  tablet, Take 500 mg by mouth 2 (Two) Times a Day With Meals., Disp: , Rfl:   •  tiZANidine (ZANAFLEX) 4 MG tablet, Take 4 mg by mouth At Night As Needed for Muscle Spasms., Disp: , Rfl:   •  traZODone (DESYREL) 50 MG tablet, TAKE ONE TABLET BY MOUTH EVERY NIGHT AT BEDTIME, Disp: , Rfl: 6  •  triamcinolone (KENALOG) 0.1 % cream, APPLY A THIN LAYER (1GRAM=QUARTER SIZE) AMOUNT TO AFFECTED AREA TWO TIMES A DAY, Disp: , Rfl: 5  •  naproxen (NAPROSYN) 250 MG tablet, Take 250 mg by mouth 2 (Two) Times a Day As Needed for Mild Pain (1-3)., Disp: , Rfl:     Social History:     Social History     Occupational History   • Not on file.     Social History Main Topics   • Smoking status: Current Every Day Smoker     Packs/day: 1.00     Years: 42.00     Types: Cigarettes   • Smokeless tobacco: Never Used      Comment: patient willing to try to quit    • Alcohol use No   • Drug use: No   • Sexual activity: Not on file      Social History     Social History Narrative       Family History:     Family History   Problem Relation Age of Onset   • Osteoarthritis Mother    • Osteoporosis Mother    • Rheum arthritis Mother    • Gout Mother    • Heart disease Mother    • Diabetes Mother    • Osteoporosis Father    • Osteoarthritis Father    • Rheum arthritis Father    • Gout Father    • Cancer Father    • Heart disease Father    • Diabetes Father        Review of Systems:   Review of Systems   Constitutional: Negative.    HENT: Negative.    Eyes: Positive for redness.   Respiratory: Positive for cough, chest tightness, shortness of breath and wheezing.    Cardiovascular: Negative.    Gastrointestinal: Negative.    Endocrine: Positive for polydipsia, polyphagia and polyuria.   Genitourinary: Positive for frequency.   Musculoskeletal:        Pertinent positives mentioned in HPI   Skin: Negative.    Neurological: Positive for syncope.   Hematological: Negative.    Psychiatric/Behavioral: Positive for confusion, dysphoric mood and sleep  "disturbance. The patient is nervous/anxious.        Physical Exam:   General Appearance:    57 y.o. male  cooperative, in no acute distress.  Alert and oriented x 3,                   Vitals:    01/29/18 0808   BP: 131/78   BP Location: Right arm   Patient Position: Sitting   Cuff Size: Adult   Pulse: 101   Weight: 86.2 kg (190 lb)   Height: 182.9 cm (72\")          HEENT: Unremarkable      Neck: Supple without lymphadenopathy.      Chest: Clear to ascultation bilaterally. Normal respiratory effort.      Heart: Regular rate and rhythm. No murmurs noted.      Skin:  Warm and dry without any rash.      Musculoskeletal: Examination of the right elbow reveals no fluid within the olecranon bursa but he has significant tenderness along the bursa .  He has full range of motion motion of the elbow with no gross instability.  His neurovascular status is intact.      Radiology:     Previous x-rays of the elbow from June 2017 were reviewed and at that time revealed soft tissue swelling within the olecranon bursa.  No bony pathology seen.    Assessment    ICD-10-CM ICD-9-CM   1. Olecranon bursitis of right elbow M70.21 726.33   2. Right elbow pain M25.521 719.42   3. Olecranon bursitis of left elbow M70.22 726.33       Plan:  A 57-year-old male with chronic olecranon bursitis of the right elbow.  He's had several injections with recurrence of pain.  Given his continued difficulty with pain and discomfort we have discussed proceeding with surgical intervention including olecranon bursectomy of the right elbow.  We have discussed the risks, benefits, and future outcomes of surgery.  He accepts these risks and is agreeable to surgery We'll place him on the OR schedule at [facility] for February 1, 2018.    Written by, Brigida OSEI, acting as a scribe for Dr. Lamas    I advised the patient of the risks in continuing to use tobacco, and I provided this patient with smoking cessation educational materials.  I also " discussed how to quit smokingt.  During this visit, I spent less than 3 minutes counseling the patient regarding smoking cessation.     Patient's BMI is above normal parameters. Follow-up plan includes:  educational material.

## 2018-02-01 NOTE — ANESTHESIA PREPROCEDURE EVALUATION
Anesthesia Evaluation     Patient summary reviewed and Nursing notes reviewed   no history of anesthetic complications:  NPO Solid Status: > 8 hours  NPO Liquid Status: > 8 hours     Airway   Mallampati: II  TM distance: >3 FB  Neck ROM: full  no difficulty expected  Dental - normal exam   (+) lower dentures and upper dentures    Pulmonary - normal exam    breath sounds clear to auscultation  (+) a smoker Current Abstained day of surgery, COPD moderate, asthma, shortness of breath, sleep apnea,   Cardiovascular - negative cardio ROS and normal exam  Exercise tolerance: good (4-7 METS)    Rhythm: regular  Rate: normal        Neuro/Psych  (+) seizures,     GI/Hepatic/Renal/Endo    (+)  GERD well controlled, diabetes mellitus type 2 well controlled,     Musculoskeletal     Abdominal  - normal exam    Bowel sounds: normal.   Substance History - negative use     OB/GYN negative ob/gyn ROS         Other   (+) arthritis   history of cancer                                          Anesthesia Plan    ASA 3     general   total IV anesthesia  intravenous induction   Anesthetic plan and risks discussed with patient.    Plan discussed with CRNA.

## 2018-02-01 NOTE — PLAN OF CARE
Problem: Patient Care Overview (Adult)  Goal: Plan of Care Review  Outcome: Ongoing (interventions implemented as appropriate)   02/01/18 1025   Coping/Psychosocial Response Interventions   Plan Of Care Reviewed With patient   Patient Care Overview   Progress progress toward functional goals as expected

## 2018-02-01 NOTE — ANESTHESIA POSTPROCEDURE EVALUATION
Patient: Jose Alberto Meng    Procedure Summary     Date Anesthesia Start Anesthesia Stop Room / Location    02/01/18 1035 1113 [facility] OR [facility] / [facility]       Procedure Diagnosis Surgeon Provider    OLECRANON BURSECTOMY RIGHT ELBOW (Right Elbow) Right elbow pain; Olecranon bursitis of right elbow  (Right elbow pain [M25.521]; Olecranon bursitis of right elbow [M70.21]) MD Omar George Depa, DO          Anesthesia Type: general  Last vitals  BP   107/68 (02/01/18 1142)   Temp   97.9 °F (36.6 °C) (02/01/18 1142)   Pulse   84 (02/01/18 1142)   Resp   16 (02/01/18 1142)     SpO2   96 % (02/01/18 1142)     Post Anesthesia Care and Evaluation    Patient location during evaluation: PHASE II  Patient participation: complete - patient participated  Level of consciousness: awake and alert  Pain score: 0  Pain management: adequate  Airway patency: patent  Anesthetic complications: No anesthetic complications  PONV Status: controlled  Cardiovascular status: acceptable and stable  Respiratory status: acceptable and room air  Hydration status: euvolemic  No anesthesia care post op

## 2018-02-02 LAB
LAB AP CASE REPORT: NORMAL
Lab: NORMAL
PATH REPORT.FINAL DX SPEC: NORMAL

## 2018-02-07 ENCOUNTER — OFFICE VISIT (OUTPATIENT)
Dept: ORTHOPEDIC SURGERY | Facility: CLINIC | Age: 58
End: 2018-02-07

## 2018-02-07 VITALS — HEIGHT: 72 IN | WEIGHT: 185 LBS | BODY MASS INDEX: 25.06 KG/M2

## 2018-02-07 DIAGNOSIS — M70.21 OLECRANON BURSITIS OF RIGHT ELBOW: Primary | ICD-10-CM

## 2018-02-07 PROCEDURE — 99024 POSTOP FOLLOW-UP VISIT: CPT | Performed by: ORTHOPAEDIC SURGERY

## 2018-02-07 NOTE — PROGRESS NOTES
"Jose Alberto Meng   :1960    Date of encounter:2018        HPI:  Jose Alberto Meng is a 57 y.o. male who presents here today status post excision of olecranon bursa of the right elbow.  Date of surgery was 2018.  He is now 1 week postop.  He still complaining of some mild pain and soreness along the posterior aspect of his elbow.  He states he is otherwise doing well without paresthesias or other complaints.    PMH:   Patient Active Problem List   Diagnosis   • Right elbow pain   • Diabetes   • Asthma   • COPD (chronic obstructive pulmonary disease)   • Osteoporosis   • Osteoarthritis   • Gout   • Sleep apnea   • GERD (gastroesophageal reflux disease)   • Olecranon bursitis of right elbow       Exam:  General Appearance:    57 y.o. male  cooperative, in no acute distress.  Alert and oriented x 3,                   Vitals:    18 1259   Weight: 83.9 kg (185 lb)   Height: 182.9 cm (72\")          Body mass index is 25.09 kg/(m^2).     Examination of the right elbow reveals a clean and dry incision without surrounding erythema or active drainage.  He has no fluid within the olecranon bursa.  He has full range of motion.  His neurovascular status is intact      Assessment    ICD-10-CM ICD-9-CM   1. Olecranon bursitis of right elbow M70.21 726.33       Plan:   A 57-year-old male 1 week status post excision of olecranon bursa of the right elbow.  His incision looks good without signs of infection and staples were removed today.  He was advised that he can slowly ease back into activities as tolerated.  He'll return back here on an as-needed basis with any further difficulties.    Written by, Brigida OSEI, acting as a scribe for Dr. Lamas              Patient's BMI is within normal parameters. No follow-up required.        "

## 2021-10-30 ENCOUNTER — HOSPITAL ENCOUNTER (EMERGENCY)
Facility: HOSPITAL | Age: 61
Discharge: LEFT BEFORE DISCHARGE | End: 2021-10-30

## 2021-10-30 PROCEDURE — 99211 OFF/OP EST MAY X REQ PHY/QHP: CPT

## (undated) DEVICE — HOLDER: Brand: DEROYAL

## (undated) DEVICE — BNDG ELAS W/CLIP 3IN 5YD LF STRL

## (undated) DEVICE — DRSNG ADAPTIC 3X8

## (undated) DEVICE — PROXIMATE RH ROTATING HEAD SKIN STAPLERS (35 WIDE) CONTAINS 35 STAINLESS STEEL STAPLES: Brand: PROXIMATE

## (undated) DEVICE — DISPOSABLE TOURNIQUET CUFF SINGLE BLADDER, SINGLE PORT AND LUER LOCK CONNECTOR: Brand: COLOR CUFF

## (undated) DEVICE — ENCORE® LATEX MICRO SIZE 7.5, STERILE LATEX POWDER-FREE SURGICAL GLOVE: Brand: ENCORE

## (undated) DEVICE — BNDG ELAS ELITE V/CLOSE 6IN 5YD LF STRL

## (undated) DEVICE — PK EXTREM UPPR 70

## (undated) DEVICE — UNDERCAST PADDING: Brand: DEROYAL

## (undated) DEVICE — SUT PROLN 4/0 PS2 18IN 8682G

## (undated) DEVICE — ARM SLING: Brand: DEROYAL

## (undated) DEVICE — SPNG GZ STRL 2S 4X4 12PLY